# Patient Record
Sex: FEMALE | Race: BLACK OR AFRICAN AMERICAN | NOT HISPANIC OR LATINO | Employment: FULL TIME | URBAN - METROPOLITAN AREA
[De-identification: names, ages, dates, MRNs, and addresses within clinical notes are randomized per-mention and may not be internally consistent; named-entity substitution may affect disease eponyms.]

---

## 2024-08-01 ENCOUNTER — OFFICE VISIT (OUTPATIENT)
Dept: FAMILY MEDICINE | Facility: CLINIC | Age: 39
End: 2024-08-01
Payer: COMMERCIAL

## 2024-08-01 VITALS
TEMPERATURE: 99 F | DIASTOLIC BLOOD PRESSURE: 61 MMHG | BODY MASS INDEX: 26.91 KG/M2 | SYSTOLIC BLOOD PRESSURE: 94 MMHG | OXYGEN SATURATION: 99 % | WEIGHT: 151.88 LBS | HEIGHT: 63 IN | HEART RATE: 92 BPM | RESPIRATION RATE: 18 BRPM

## 2024-08-01 DIAGNOSIS — D50.8 OTHER IRON DEFICIENCY ANEMIA: ICD-10-CM

## 2024-08-01 DIAGNOSIS — R87.612 LGSIL ON PAP SMEAR OF CERVIX: Primary | ICD-10-CM

## 2024-08-01 DIAGNOSIS — E55.9 VITAMIN D DEFICIENCY: ICD-10-CM

## 2024-08-01 DIAGNOSIS — F41.9 ANXIETY: ICD-10-CM

## 2024-08-01 RX ORDER — SERTRALINE HYDROCHLORIDE 25 MG/1
25 TABLET, FILM COATED ORAL DAILY
Qty: 30 TABLET | Refills: 11 | Status: SHIPPED | OUTPATIENT
Start: 2024-08-01 | End: 2025-08-01

## 2024-08-01 NOTE — PROGRESS NOTES
Subjective:       Patient ID: Little Brown is a 38 y.o. female.    Chief Complaint: Follow-up and Medication Refill (Vitamin D deficiency/Iron deficiency anemia)    HPI    39 yo for follow up    LSIL neg HPV (previous ASCUS)- repeat pap in 1 year vs GYN again discussed today and patient agrees to referral to be sent.    Anxiety- reports that buspar makes her too sleepy. Symptoms daily include feeling like she is bouncing around, can't relax, mind constantly worrying, and occasional panic attacks. No symptoms of depression. No SI. Would like to try a different medication.     Vit D deficiency- pt took weekly supplements but not currently on daily supplements    Doing well on OCP    Taking iron supplements    Review of Systems  As per HPI         Objective:      Vitals:    08/01/24 1007   BP: 94/61   Pulse: 92   Resp: 18   Temp: 98.6 °F (37 °C)       Physical Exam    General: pleasant, well developed, in no acute distress  Head: normocephalic, atraumatic  Skin: warm and dry  Heart: regular rate and rhythm, S1S2 normal, no murmurs, rubs, or gallops  Lungs: clear to auscultation bilaterally, no wheezes  Neurological: nonfocal, alert and oriented, cooperative with exam  Psych: judgement and insight good, though process logical, goal directed      Assessment/Plan:  LGSIL on Pap smear of cervix  -     Ambulatory referral/consult to Obstetrics / Gynecology; Future; Expected date: 08/08/2024  Referred to GYN    Anxiety  - stop buspar and start zoloft. Continue to monitor    Vitamin D deficiency  - recommended daily Vit D supplements    Other iron deficiency anemia  - continue iron    Other orders  -     sertraline (ZOLOFT) 25 MG tablet; Take 1 tablet (25 mg total) by mouth once daily.  Dispense: 30 tablet; Refill: 11         Follow up in about 4 weeks (around 8/29/2024) for follow up anxiety.

## 2024-08-06 ENCOUNTER — PATIENT MESSAGE (OUTPATIENT)
Dept: FAMILY MEDICINE | Facility: CLINIC | Age: 39
End: 2024-08-06
Payer: COMMERCIAL

## 2024-09-05 ENCOUNTER — OFFICE VISIT (OUTPATIENT)
Dept: FAMILY MEDICINE | Facility: CLINIC | Age: 39
End: 2024-09-05
Payer: COMMERCIAL

## 2024-09-05 VITALS
SYSTOLIC BLOOD PRESSURE: 118 MMHG | RESPIRATION RATE: 20 BRPM | HEART RATE: 91 BPM | TEMPERATURE: 98 F | BODY MASS INDEX: 26.93 KG/M2 | WEIGHT: 152 LBS | DIASTOLIC BLOOD PRESSURE: 76 MMHG | HEIGHT: 63 IN | OXYGEN SATURATION: 100 %

## 2024-09-05 DIAGNOSIS — D50.8 OTHER IRON DEFICIENCY ANEMIA: ICD-10-CM

## 2024-09-05 DIAGNOSIS — F41.9 ANXIETY: Primary | ICD-10-CM

## 2024-09-05 DIAGNOSIS — E55.9 VITAMIN D DEFICIENCY: ICD-10-CM

## 2024-09-05 DIAGNOSIS — R87.612 LGSIL ON PAP SMEAR OF CERVIX: ICD-10-CM

## 2024-09-05 PROCEDURE — 99213 OFFICE O/P EST LOW 20 MIN: CPT | Mod: PBBFAC | Performed by: FAMILY MEDICINE

## 2024-09-05 NOTE — PROGRESS NOTES
Subjective:       Patient ID: Little Brown is a 38 y.o. female.    Chief Complaint: Follow-up (1 month visit ,no complaints)    HPI  39 yo for follow up of anxiety    Anxiety- zoloft started at the last appointment after buspar was stopped due to making her too sleepy. Doing well. Symptoms are better controlled now. No side effects of zoloft. Able to relax better, sleeping ok. No panic attacks. No symptoms of depression. No SI/HI. Current dose is adequate per patient.     LSIL neg HPV (previous ASCUS)- has been referred to GYN and has received a call from their office.     Vit D deficiency- on daily supplements     On OCP and taking iron supplements    Review of Systems  As per HPI         Objective:      Vitals:    09/05/24 0808   BP: 118/76   Pulse: 91   Resp: 20   Temp: 98.1 °F (36.7 °C)       Physical Exam    General: pleasant, well developed, in no acute distress  Head: normocephalic, atraumatic  Skin: warm and dry  Heart: regular rate and rhythm, S1S2 normal, no murmurs, rubs, or gallops  Lungs: clear to auscultation bilaterally, no wheezes  Neurological: nonfocal, alert and oriented, cooperative with exam  Psych: judgement and insight good, though process logical, goal directed      Assessment/Plan:  Anxiety  - improved with zoloft; continue current treatment and continue to monitor    Vitamin D deficiency  - Continue supplements    LGSIL on Pap smear of cervix  - keep appointment with gyn    Other iron deficiency anemia  -continue iron     Follow up in about 6 months (around 3/5/2025).

## 2025-03-13 ENCOUNTER — OFFICE VISIT (OUTPATIENT)
Dept: FAMILY MEDICINE | Facility: CLINIC | Age: 40
End: 2025-03-13
Payer: COMMERCIAL

## 2025-03-13 VITALS
HEIGHT: 63 IN | TEMPERATURE: 98 F | HEART RATE: 77 BPM | DIASTOLIC BLOOD PRESSURE: 77 MMHG | BODY MASS INDEX: 26.93 KG/M2 | OXYGEN SATURATION: 99 % | SYSTOLIC BLOOD PRESSURE: 117 MMHG

## 2025-03-13 DIAGNOSIS — Z30.9 ENCOUNTER FOR CONTRACEPTIVE MANAGEMENT, UNSPECIFIED TYPE: ICD-10-CM

## 2025-03-13 DIAGNOSIS — R87.612 LGSIL ON PAP SMEAR OF CERVIX: ICD-10-CM

## 2025-03-13 DIAGNOSIS — F41.9 ANXIETY: Primary | ICD-10-CM

## 2025-03-13 DIAGNOSIS — E55.9 VITAMIN D DEFICIENCY: ICD-10-CM

## 2025-03-13 PROCEDURE — 99213 OFFICE O/P EST LOW 20 MIN: CPT | Mod: PBBFAC | Performed by: FAMILY MEDICINE

## 2025-03-13 RX ORDER — SERTRALINE HYDROCHLORIDE 25 MG/1
25 TABLET, FILM COATED ORAL DAILY
Qty: 30 TABLET | Refills: 11 | Status: SHIPPED | OUTPATIENT
Start: 2025-03-13 | End: 2026-03-13

## 2025-03-13 RX ORDER — NORELGESTROMIN AND ETHINYL ESTRADIOL 35; 150 UG/MG; UG/MG
1 PATCH TRANSDERMAL WEEKLY
Qty: 4 PATCH | Refills: 11 | Status: SHIPPED | OUTPATIENT
Start: 2025-03-13 | End: 2026-03-13

## 2025-03-13 RX ORDER — ASPIRIN 325 MG
50000 TABLET, DELAYED RELEASE (ENTERIC COATED) ORAL
Qty: 12 CAPSULE | Refills: 0 | Status: SHIPPED | OUTPATIENT
Start: 2025-03-13

## 2025-04-14 ENCOUNTER — PATIENT MESSAGE (OUTPATIENT)
Dept: FAMILY MEDICINE | Facility: CLINIC | Age: 40
End: 2025-04-14
Payer: COMMERCIAL

## 2025-04-15 RX ORDER — TRIAMCINOLONE ACETONIDE 1 MG/G
CREAM TOPICAL 2 TIMES DAILY
Qty: 60 G | Refills: 1 | Status: SHIPPED | OUTPATIENT
Start: 2025-04-15